# Patient Record
Sex: MALE | Race: WHITE
[De-identification: names, ages, dates, MRNs, and addresses within clinical notes are randomized per-mention and may not be internally consistent; named-entity substitution may affect disease eponyms.]

---

## 2018-02-25 ENCOUNTER — HOSPITAL ENCOUNTER (EMERGENCY)
Dept: HOSPITAL 45 - C.EDC | Age: 43
Discharge: HOME | End: 2018-02-25
Payer: COMMERCIAL

## 2018-02-25 VITALS
WEIGHT: 208.34 LBS | BODY MASS INDEX: 29.17 KG/M2 | BODY MASS INDEX: 29.17 KG/M2 | HEIGHT: 70.98 IN | WEIGHT: 208.34 LBS | HEIGHT: 70.98 IN

## 2018-02-25 VITALS — SYSTOLIC BLOOD PRESSURE: 103 MMHG | HEART RATE: 84 BPM | DIASTOLIC BLOOD PRESSURE: 67 MMHG | OXYGEN SATURATION: 97 %

## 2018-02-25 VITALS — OXYGEN SATURATION: 98 %

## 2018-02-25 DIAGNOSIS — I10: ICD-10-CM

## 2018-02-25 DIAGNOSIS — E05.90: ICD-10-CM

## 2018-02-25 DIAGNOSIS — R42: ICD-10-CM

## 2018-02-25 DIAGNOSIS — R51: ICD-10-CM

## 2018-02-25 DIAGNOSIS — R05: Primary | ICD-10-CM

## 2018-02-25 DIAGNOSIS — Z97.0: ICD-10-CM

## 2018-02-25 DIAGNOSIS — Z77.128: ICD-10-CM

## 2018-02-25 NOTE — EMERGENCY ROOM VISIT NOTE
History


Report prepared by Margarita:  Isela Fish


Under the Supervision of:  Dr. Surjit Trejo M.D.


First contact with patient:  16:44


Chief Complaint:  CARBON MONOXIDE EXPOSURE


Stated Complaint:  CO EXPOSURE





History of Present Illness


The patient is a 42 year old male who presents to the Emergency Room with 

complaints of an episode of carbon monoxide exposure occurring seven hours ago. 

The patient reports that the alarm went off this morning, but there was no 

fire. He reports that they are unsure where the source is coming from. He 

states that he called the [email or web address] an hour ago. EMS reports that levels 

were in the 400's to 500s. The patient complains of coughing, dizziness, and a 

headache. The patient denies vomiting and change of vision.  Son is presenting 

for similar symptoms at the exact same time and is being seen in the same room.





   Source of History:  patient


   Onset:  seven hours ago


   Position:  other (global)


   Quality:  other (carbon monoxide exposure)


   Timing:  other (episode)


   Associated Symptoms:  + headache, + cough, No vomiting


Note:


The patient complains of dizziness. The patient denies change of vision.





Review of Systems


See HPI for pertinent positives and negatives.  A total of ten systems were 

reviewed and were otherwise negative.





Past Medical & Surgical


Medical Problems:


(1) HTN (hypertension)


(2) Hyperthyroidism


(3) Prosthetic eye globe








Family History





No pertinent family history





Social History


Marital Status:  


Housing Status:  lives with family





Current/Historical Medications


Scheduled


Levothyroxine Sodium (Levothyroxine Sodium), 50 MCG PO DAILY


Metoprolol Succ (Toprol Xl) (Toprol-Xl), 50 MG PO DAILY





Allergies


Coded Allergies:  


     No Known Allergies (Unverified , 2/25/18)





Physical Exam


Vital Signs











  Date Time  Temp Pulse Resp B/P (MAP) Pulse Ox O2 Delivery O2 Flow Rate FiO2


 


2/25/18 18:54  84 16 103/67 97   


 


2/25/18 17:04  71  117/75 98 Non-Rebreather 15.0 


 


2/25/18 17:03     98 Non-Rebreather 15.0 


 


2/25/18 17:02     98 Non-Rebreather 15.0 











Physical Exam


Physical Exam 


GENERAL:  He is oriented to person, place, and time. He appears well-developed 

and well-nourished. He does not appear distressed.


HENT:  Exam performed. 


   Head:  Normocephalic and atraumatic. 


   Right Ear:  External ear normal. No mastoid tenderness. 


   Left Ear: External ear normal. No mastoid tenderness. 


   Mouth/Throat:  The oropharynx is clear and moist. No trismus in the jaw. No 

dental abscesses or uvula swelling. No oropharyngeal exudate or tonsillar 

abscesses.


EYES: Conjunctivae and EOM are normal. Pupils are equal, round, and reactive to 

light. Right eye exhibits no discharge. Left eye exhibits no discharge. No 

scleral icterus. 


NECK: Normal range of motion. Neck supple. No JVD present. No spinous process 

tenderness present. No carotid bruit present. No rigidity. No tracheal 

deviation and normal range of motion present. No Brudzinski's sign and no Kernig

's sign noted. 


CV: Normal rate, regular rhythm, normal heart sounds and intact distal pulses. 

There is no peripheral edema. Palpable radial pulses bue.  


PULM/CHEST:  Effort normal and breath sounds normal. No respiratory distress. 

No stridor. He has no wheezes. He has no rales. 


   Chest Wall:  He exhibits no tenderness. 


ABD: The abdomen is soft. Bowel sounds are normal. He has no distension. No 

mass is present. There is no tenderness. There is no rebound, no guarding, no 

Serra's sign and no tenderness at McBurney's point. Rovsig negative


MUSC/SKEL: Normal range of motion. There is no peripheral edema, tenderness or 

deformity. 


LYMPH: No cervical adenopathy. 


NEURO: He is alert and oriented to person, place, and time. He has normal 

strength. No cranial nerve deficit or sensory deficit. Coordination and gait 

normal. GCS eye subscore is 4. GCS verbal subscore is 5. GCS motor subscore is 

6. Cerebellar tests wnl. 


SKIN: Skin is warm and dry. He is not diaphoretic. 


PSYCH: He has a normal mood and affect. He behavior is normal. Judgment and 

thought content normal.





Medical Decision & Procedures


Laboratory Results











Test


  2/25/18


17:50


 


Carboxyhemoglobin 2.5 % [facility] 





Laboratory results reviewed by me





ED Course


1648: The patient was evaluated in room C12A. A complete history and physical 

exam was performed. The father and sone were seen at the same time and 

immediately both started on non-rebreather oxygen.





1843: I reevaluated the patient and vital signs were stable. Patient felt fine. 

The patient denies having headache, nausea, and vomiting. Carbon monoxide 

levels were within normal limits. The patient will be discharged. He has a safe 

place to stay and will follow up with the PCP. DISCHARGE - Plan of care 

discussed with patient and questions answered. The patient was given both 

verbal and printed discharge instructions. The patient verbalized understanding 

and ability to comply. The patient is to seek outpatient follow up as noted in 

the discharge instructions. The patient verbalized understanding and ability to 

comply. The patient is discharged in stable condition. The patient was 

instructed to return for worsening symptoms.





Medical Decision


1648: The patient was evaluated in room C12A. A complete history and physical 

exam was performed. The father and sone were seen at the same time and 

immediately both started on non-rebreather oxygen.





1843: I reevaluated the patient and vital signs were stable. Patient felt fine. 

The patient denies having headache, nausea, and vomiting. Carbon monoxide 

levels were within normal limits. The patient will be discharged. He has a safe 

place to stay and will follow up with the PCP. DISCHARGE - Plan of care 

discussed with patient and questions answered. The patient was given both 

verbal and printed discharge instructions. The patient verbalized understanding 

and ability to comply. The patient is to seek outpatient follow up as noted in 

the discharge instructions. The patient verbalized understanding and ability to 

comply. The patient is discharged in stable condition. The patient was 

instructed to return for worsening symptoms.





Medication Reconcilliation


Current Medication List:  was personally reviewed by me





Blood Pressure Screening


Patient's blood pressure:  Normal blood pressure


Blood pressure disposition:  Did not require urgent referral





Impression





 Primary Impression:  


 Exposure to carbon monoxide





Scribe Attestation


The scribe's documentation has been prepared under my direction and personally 

reviewed by me in its entirety. I confirm that the note above accurately 

reflects all work, treatment, procedures, and medical decision making performed 

by me.





The chart was completed utilizing Dragon Speech voice recognition software. 

Grammatical errors, random word insertions, pronoun errors, and incomplete 

sentences are an occasional consequence of this system due to software 

limitations, ambient noise, and hardware issues. Any formal questions or 

concerns about the content, text, or information contained within the body of 

this dictation should be directly addressed to the physician for clarification.





Departure Information


Dispostion


Home / Self-Care





Forms


HOME CARE DOCUMENTATION FORM,                                                 

               IMPORTANT VISIT INFORMATION





Patient Instructions


My [facility]





Additional Instructions





Return to the emergency department if you develop headache, changes in vision, 

nausea, vomiting, fever greater than 100.4, difficulty breathing.